# Patient Record
Sex: MALE | Race: OTHER | Employment: UNEMPLOYED | URBAN - METROPOLITAN AREA
[De-identification: names, ages, dates, MRNs, and addresses within clinical notes are randomized per-mention and may not be internally consistent; named-entity substitution may affect disease eponyms.]

---

## 2024-08-15 ENCOUNTER — TELEPHONE (OUTPATIENT)
Dept: OTOLARYNGOLOGY | Facility: CLINIC | Age: 29
End: 2024-08-15

## 2024-08-15 ENCOUNTER — OFFICE VISIT (OUTPATIENT)
Dept: OTOLARYNGOLOGY | Facility: CLINIC | Age: 29
End: 2024-08-15

## 2024-08-15 DIAGNOSIS — R06.83 SNORING: Primary | ICD-10-CM

## 2024-08-15 DIAGNOSIS — R40.0 DAYTIME SLEEPINESS: ICD-10-CM

## 2024-08-15 DIAGNOSIS — J34.3 HYPERTROPHY OF NASAL TURBINATES: ICD-10-CM

## 2024-08-15 DIAGNOSIS — R09.81 NASAL CONGESTION: ICD-10-CM

## 2024-08-15 DIAGNOSIS — J34.2 DEVIATED NASAL SEPTUM: ICD-10-CM

## 2024-08-15 PROCEDURE — 31575 DIAGNOSTIC LARYNGOSCOPY: CPT | Performed by: STUDENT IN AN ORGANIZED HEALTH CARE EDUCATION/TRAINING PROGRAM

## 2024-08-15 PROCEDURE — 99203 OFFICE O/P NEW LOW 30 MIN: CPT | Performed by: STUDENT IN AN ORGANIZED HEALTH CARE EDUCATION/TRAINING PROGRAM

## 2024-08-15 RX ORDER — FLUTICASONE PROPIONATE 50 MCG
2 SPRAY, SUSPENSION (ML) NASAL 2 TIMES DAILY
Qty: 16 G | Refills: 3 | Status: SHIPPED | OUTPATIENT
Start: 2024-08-15 | End: 2024-08-16

## 2024-08-15 RX ORDER — AZELASTINE 1 MG/ML
2 SPRAY, METERED NASAL 2 TIMES DAILY
Qty: 30 ML | Refills: 3 | Status: SHIPPED | OUTPATIENT
Start: 2024-08-15

## 2024-08-15 NOTE — PROGRESS NOTES
[place]  OTOLARYNGOLOGY - HEAD & NECK SURGERY    8/15/2024     Reason for Consultation:   Snoring, nasal congestion    History of Present Illness:   Patient is a pleasant 28 year old male who is being seen for significant nasal congestion, and snoring he has had for many years.  The patient states that he has been hit in the nose many times before playing basketball and has broken it.  He does note that his nasal congestion is worse at night when he is trying to sleep.  He has not tried any nasal sprays for this in the past.  He is also considering having the cosmetic appearance of his nose fixed.  In terms of his snoring he states that it is very severe and he has been told by his partner as well as his mother that his snoring is quite loud and disturbing.  He does also have daytime sleepiness where he falls asleep during certain activities.  He has not had a previous sleep study.  No history of nasal or oropharyngeal surgery.    Past Medical History  History reviewed. No pertinent past medical history.    Past Surgical History  History reviewed. No pertinent surgical history.    Family History  Family History   Problem Relation Age of Onset    Diabetes Mother        Social History  Pediatric History   Patient Parents    Not on file     Other Topics Concern    Not on file   Social History Narrative    Not on file           Current Medications:  Current Outpatient Medications   Medication Sig Dispense Refill    fluticasone propionate 50 MCG/ACT Nasal Suspension 2 sprays by Nasal route 2 (two) times daily. 16 g 3    azelastine 0.1 % Nasal Solution 2 sprays by Nasal route 2 (two) times daily. 30 mL 3       Allergies  Not on File    Review of Systems:   A comprehensive 10 point review of systems was completed.  Pertinent positives and negatives noted in the the HPI.    Physical Exam:   There were no vitals taken for this visit.    GENERAL: No acute distress, Comfortable appearing  FACE: HB 1/6, Normal Animation  HEAD:  Normocephalic  EYES: EOMI, pupils equil  EARS: Bilateral Auricles Symmetric, bilateral tympanic membranes normal  NOSE: Nares patent bilaterally  ORAL CAVITY: Tongue mobile with large base of tongue, Oropharynx clear, elongated soft palate with mild narrowing, floor of mouth clear, Posterior oropharynx normal  NECK: No palpable lymphadenopathy, thyroid not palpable, nontender    PROCEDURE: FLEXIBLE FIBEROPTIC LARYNGOSCOPY (67256)    Due to inability for adequate examination of the larynx and need for magnification to perform the examination, endoscopy was performed.  Risks and benefits were discussed with patient/family and they have given verbal consent to proceed.    Procedure Detail & Findings:     After placement of topical anesthetic intranasally the flexible laryngoscope was inserted into the nare and driven through the nasal cavity with no significant abnormal findings noted in the nasal cavities or nasopharynx. The oropharynx, hypopharynx and larynx were subsequently examined and the following findings were noted:    Nasal cavity: There is a deviation of the septum to the left that swings over to the right side at the most caudal aspect.  The bilateral inferior turbinates appear mildly hypertrophied    Base of Tongue: Large base of tongue with narrowing of the AP distance    Valeculla: Normal    Arytenoids: Normal    Introitus of the esophagus: Normal    Epiglottis: Normal    False vocal cords: Normal    True vocal cords: Normal    Subglottic space: Normal    Mobility of True vocal cords: Normal    Condition: Stable    Complications: Patient tolerated the procedure well with no immediate complication.      Results:     Laboratory Data:  No results found for: \"WBC\", \"HGB\", \"HCT\", \"PLT\", \"CREATSERUM\", \"BUN\", \"NA\", \"K\", \"CL\", \"CO2\", \"GLU\", \"CA\", \"ALB\", \"ALKPHO\", \"TP\", \"AST\", \"ALT\", \"PTT\", \"INR\", \"PTP\", \"T4F\", \"TSH\", \"TSHREFLEX\", \"CHRIS\", \"LIP\", \"GGT\", \"PSA\", \"DDIMER\", \"ESRML\", \"ESRPF\", \"CRP\", \"BNP\", \"MG\", \"PHOS\",  \"TROP\", \"CK\", \"CKMB\", \"LEXII\", \"RPR\", \"B12\", \"ETOH\", \"POCGLU\"      Imaging:  No results found.      Impression:       ICD-10-CM    1. Snoring  R06.83 Home Sleep Apnea Test (Adult pt only) - Sleep consult required for Medicare pts     General sleep study      2. Daytime sleepiness  R40.0 Home Sleep Apnea Test (Adult pt only) - Sleep consult required for Medicare pts     General sleep study      3. Deviated nasal septum  J34.2       4. Hypertrophy of nasal turbinates  J34.3       5. Nasal congestion  R09.81            Recommendations:  I would like to obtain sleep study given the degree of snoring and his daytime sleepiness.  He does have a deviated septum to the left side, however I discussed with him that fixing this would not likely cure any sleep apnea given it is moderate.  He is also considering fixing his septum at the same time as getting an open rhinoplasty, since he would like to change the cosmetic appearance of his nose.  I will reach out to him with his sleep apnea results.  I discussed that may be better for him to see a dedicated facial cosmetic surgeon if he is considering cosmetic rhinoplasty.    Thank you for allowing me to participate in the care of your patient.    Vinh Curiel,    Otolaryngology/Rhinology, Sinus, and Endoscopic Skull Base Surgery  [facility]   [address] [place] [id number]  Phone [phone]  Fax [phone]  8/15/2024  12:19 PM  8/15/2024

## 2024-08-15 NOTE — TELEPHONE ENCOUNTER
Drug change request for  FLUTICASONE 50 MCG NASAL SP (120 ) RX  SHAKE LIQUID AND USE 2 SPRAYS IN EACH NOSTRIL TWICE DAILY    Drug not covered by patient plan. The preferred alternative is FLUNISOLIDESPR  Please call/fax the pharmacy to change medication along with strength directions quant and refills.

## 2024-08-15 NOTE — TELEPHONE ENCOUNTER
Please advise if you would like to change to plan preferred flunisolide or for patient to obtain fluticasone OTC or out of pocket at pharmacy.

## 2024-08-16 RX ORDER — FLUNISOLIDE 0.25 MG/ML
2 SOLUTION NASAL 2 TIMES DAILY
Qty: 25 ML | Refills: 0 | Status: SHIPPED | OUTPATIENT
Start: 2024-08-16 | End: 2024-08-19

## 2024-08-19 RX ORDER — FLUNISOLIDE 0.25 MG/ML
2 SOLUTION NASAL 2 TIMES DAILY
Qty: 75 ML | Refills: 0 | Status: SHIPPED | OUTPATIENT
Start: 2024-08-19

## 2024-08-19 NOTE — TELEPHONE ENCOUNTER
This refill request is being sent to the provider for the following reason:  []Patient has not had an appointment within the past 12 months but has made an appointment on: ___  [x]Medication is not within protocol - Flunisolide  []Patient did not complete follow up recommendations  []Other: ___    Last office visit was:  8/15/24  - Patient wants a 90 day supply